# Patient Record
Sex: FEMALE | URBAN - METROPOLITAN AREA
[De-identification: names, ages, dates, MRNs, and addresses within clinical notes are randomized per-mention and may not be internally consistent; named-entity substitution may affect disease eponyms.]

---

## 2017-01-17 ENCOUNTER — EMERGENCY (EMERGENCY)
Facility: HOSPITAL | Age: 27
LOS: 1 days | End: 2017-01-17
Payer: SELF-PAY

## 2017-01-17 DIAGNOSIS — Z98.89 OTHER SPECIFIED POSTPROCEDURAL STATES: Chronic | ICD-10-CM

## 2017-01-17 PROCEDURE — 99285 EMERGENCY DEPT VISIT HI MDM: CPT | Mod: 25

## 2017-01-17 PROCEDURE — 99053 MED SERV 10PM-8AM 24 HR FAC: CPT

## 2017-01-17 PROCEDURE — 70450 CT HEAD/BRAIN W/O DYE: CPT | Mod: 26

## 2017-01-29 ENCOUNTER — EMERGENCY (EMERGENCY)
Facility: HOSPITAL | Age: 27
LOS: 1 days | End: 2017-01-29
Payer: SELF-PAY

## 2017-01-29 DIAGNOSIS — Z98.89 OTHER SPECIFIED POSTPROCEDURAL STATES: Chronic | ICD-10-CM

## 2017-01-29 PROCEDURE — 70450 CT HEAD/BRAIN W/O DYE: CPT | Mod: 26

## 2017-01-29 PROCEDURE — 99285 EMERGENCY DEPT VISIT HI MDM: CPT

## 2017-02-23 ENCOUNTER — EMERGENCY (EMERGENCY)
Facility: HOSPITAL | Age: 27
LOS: 1 days | End: 2017-02-23
Admitting: EMERGENCY MEDICINE
Payer: SELF-PAY

## 2017-02-23 DIAGNOSIS — Z98.89 OTHER SPECIFIED POSTPROCEDURAL STATES: Chronic | ICD-10-CM

## 2017-02-23 PROCEDURE — 93971 EXTREMITY STUDY: CPT

## 2017-02-23 PROCEDURE — 99284 EMERGENCY DEPT VISIT MOD MDM: CPT

## 2017-02-23 PROCEDURE — 73610 X-RAY EXAM OF ANKLE: CPT

## 2017-02-23 PROCEDURE — 99284 EMERGENCY DEPT VISIT MOD MDM: CPT | Mod: 25

## 2017-02-23 PROCEDURE — 93971 EXTREMITY STUDY: CPT | Mod: 26,RT

## 2017-02-23 PROCEDURE — 73610 X-RAY EXAM OF ANKLE: CPT | Mod: 26,RT

## 2019-01-18 ENCOUNTER — EMERGENCY (EMERGENCY)
Facility: HOSPITAL | Age: 29
LOS: 1 days | End: 2019-01-18
Payer: OTHER GOVERNMENT

## 2019-01-18 DIAGNOSIS — Z98.89 OTHER SPECIFIED POSTPROCEDURAL STATES: Chronic | ICD-10-CM

## 2019-01-18 PROCEDURE — 99284 EMERGENCY DEPT VISIT MOD MDM: CPT

## 2019-01-18 PROCEDURE — 93970 EXTREMITY STUDY: CPT | Mod: 26

## 2019-09-24 ENCOUNTER — EMERGENCY (EMERGENCY)
Facility: HOSPITAL | Age: 29
LOS: 1 days | End: 2019-09-24
Admitting: EMERGENCY MEDICINE
Payer: SELF-PAY

## 2019-09-24 DIAGNOSIS — Z98.89 OTHER SPECIFIED POSTPROCEDURAL STATES: Chronic | ICD-10-CM

## 2019-09-24 PROCEDURE — 99284 EMERGENCY DEPT VISIT MOD MDM: CPT

## 2021-11-01 ENCOUNTER — OUTPATIENT (OUTPATIENT)
Dept: OUTPATIENT SERVICES | Facility: HOSPITAL | Age: 31
LOS: 1 days | End: 2021-11-01
Payer: MEDICAID

## 2021-11-01 DIAGNOSIS — Z98.89 OTHER SPECIFIED POSTPROCEDURAL STATES: Chronic | ICD-10-CM

## 2021-11-14 ENCOUNTER — INPATIENT (INPATIENT)
Facility: HOSPITAL | Age: 31
LOS: 0 days | Discharge: HOME | DRG: 52 | End: 2021-11-15
Attending: HOSPITALIST | Admitting: STUDENT IN AN ORGANIZED HEALTH CARE EDUCATION/TRAINING PROGRAM
Payer: MEDICAID

## 2021-11-14 VITALS
DIASTOLIC BLOOD PRESSURE: 110 MMHG | HEIGHT: 66 IN | SYSTOLIC BLOOD PRESSURE: 150 MMHG | TEMPERATURE: 98 F | HEART RATE: 97 BPM | OXYGEN SATURATION: 99 % | RESPIRATION RATE: 19 BRPM | WEIGHT: 139.99 LBS

## 2021-11-14 DIAGNOSIS — Z78.9 OTHER SPECIFIED HEALTH STATUS: Chronic | ICD-10-CM

## 2021-11-14 DIAGNOSIS — R56.9 UNSPECIFIED CONVULSIONS: ICD-10-CM

## 2021-11-14 DIAGNOSIS — Z98.89 OTHER SPECIFIED POSTPROCEDURAL STATES: Chronic | ICD-10-CM

## 2021-11-14 LAB
ADD ON TEST-SPECIMEN IN LAB: SIGNIFICANT CHANGE UP
ADD ON TEST-SPECIMEN IN LAB: SIGNIFICANT CHANGE UP
ALBUMIN SERPL ELPH-MCNC: 3.1 G/DL — LOW (ref 3.3–5)
ALP SERPL-CCNC: 100 U/L — SIGNIFICANT CHANGE UP (ref 40–120)
ALT FLD-CCNC: 27 U/L — SIGNIFICANT CHANGE UP (ref 12–78)
ANION GAP SERPL CALC-SCNC: 2 MMOL/L — LOW (ref 5–17)
AST SERPL-CCNC: 34 U/L — SIGNIFICANT CHANGE UP (ref 15–37)
BASOPHILS # BLD AUTO: 0.02 K/UL — SIGNIFICANT CHANGE UP (ref 0–0.2)
BASOPHILS NFR BLD AUTO: 0.2 % — SIGNIFICANT CHANGE UP (ref 0–2)
BILIRUB SERPL-MCNC: 0.2 MG/DL — SIGNIFICANT CHANGE UP (ref 0.2–1.2)
BUN SERPL-MCNC: 15 MG/DL — SIGNIFICANT CHANGE UP (ref 7–23)
CALCIUM SERPL-MCNC: 8.8 MG/DL — SIGNIFICANT CHANGE UP (ref 8.5–10.1)
CHLORIDE SERPL-SCNC: 104 MMOL/L — SIGNIFICANT CHANGE UP (ref 96–108)
CO2 SERPL-SCNC: 29 MMOL/L — SIGNIFICANT CHANGE UP (ref 22–31)
CREAT SERPL-MCNC: 1.09 MG/DL — SIGNIFICANT CHANGE UP (ref 0.5–1.3)
EOSINOPHIL # BLD AUTO: 0.02 K/UL — SIGNIFICANT CHANGE UP (ref 0–0.5)
EOSINOPHIL NFR BLD AUTO: 0.2 % — SIGNIFICANT CHANGE UP (ref 0–6)
GLUCOSE SERPL-MCNC: 98 MG/DL — SIGNIFICANT CHANGE UP (ref 70–99)
HCG SERPL-ACNC: <1 MIU/ML — SIGNIFICANT CHANGE UP
HCT VFR BLD CALC: 32.9 % — LOW (ref 34.5–45)
HGB BLD-MCNC: 9.1 G/DL — LOW (ref 11.5–15.5)
IMM GRANULOCYTES NFR BLD AUTO: 0.2 % — SIGNIFICANT CHANGE UP (ref 0–1.5)
LYMPHOCYTES # BLD AUTO: 0.73 K/UL — LOW (ref 1–3.3)
LYMPHOCYTES # BLD AUTO: 9.1 % — LOW (ref 13–44)
MCHC RBC-ENTMCNC: 19.3 PG — LOW (ref 27–34)
MCHC RBC-ENTMCNC: 27.7 GM/DL — LOW (ref 32–36)
MCV RBC AUTO: 69.9 FL — LOW (ref 80–100)
MONOCYTES # BLD AUTO: 0.28 K/UL — SIGNIFICANT CHANGE UP (ref 0–0.9)
MONOCYTES NFR BLD AUTO: 3.5 % — SIGNIFICANT CHANGE UP (ref 2–14)
NEUTROPHILS # BLD AUTO: 6.97 K/UL — SIGNIFICANT CHANGE UP (ref 1.8–7.4)
NEUTROPHILS NFR BLD AUTO: 86.8 % — HIGH (ref 43–77)
PLATELET # BLD AUTO: 341 K/UL — SIGNIFICANT CHANGE UP (ref 150–400)
POTASSIUM SERPL-MCNC: 4.3 MMOL/L — SIGNIFICANT CHANGE UP (ref 3.5–5.3)
POTASSIUM SERPL-SCNC: 4.3 MMOL/L — SIGNIFICANT CHANGE UP (ref 3.5–5.3)
PROT SERPL-MCNC: 9.5 GM/DL — HIGH (ref 6–8.3)
RBC # BLD: 4.71 M/UL — SIGNIFICANT CHANGE UP (ref 3.8–5.2)
RBC # FLD: 17.4 % — HIGH (ref 10.3–14.5)
SARS-COV-2 RNA SPEC QL NAA+PROBE: SIGNIFICANT CHANGE UP
SODIUM SERPL-SCNC: 135 MMOL/L — SIGNIFICANT CHANGE UP (ref 135–145)
WBC # BLD: 8.04 K/UL — SIGNIFICANT CHANGE UP (ref 3.8–10.5)
WBC # FLD AUTO: 8.04 K/UL — SIGNIFICANT CHANGE UP (ref 3.8–10.5)

## 2021-11-14 PROCEDURE — G0008: CPT

## 2021-11-14 PROCEDURE — 90686 IIV4 VACC NO PRSV 0.5 ML IM: CPT

## 2021-11-14 PROCEDURE — 95819 EEG AWAKE AND ASLEEP: CPT

## 2021-11-14 PROCEDURE — 93010 ELECTROCARDIOGRAM REPORT: CPT

## 2021-11-14 PROCEDURE — 99221 1ST HOSP IP/OBS SF/LOW 40: CPT

## 2021-11-14 PROCEDURE — 70450 CT HEAD/BRAIN W/O DYE: CPT | Mod: 26,MA

## 2021-11-14 PROCEDURE — G0378: CPT

## 2021-11-14 PROCEDURE — 36415 COLL VENOUS BLD VENIPUNCTURE: CPT

## 2021-11-14 PROCEDURE — 99285 EMERGENCY DEPT VISIT HI MDM: CPT

## 2021-11-14 PROCEDURE — 80307 DRUG TEST PRSMV CHEM ANLYZR: CPT

## 2021-11-14 RX ORDER — BUPRENORPHINE AND NALOXONE 2; .5 MG/1; MG/1
1 TABLET SUBLINGUAL DAILY
Refills: 0 | Status: DISCONTINUED | OUTPATIENT
Start: 2021-11-15 | End: 2021-11-15

## 2021-11-14 RX ORDER — INFLUENZA VIRUS VACCINE 15; 15; 15; 15 UG/.5ML; UG/.5ML; UG/.5ML; UG/.5ML
0.5 SUSPENSION INTRAMUSCULAR ONCE
Refills: 0 | Status: COMPLETED | OUTPATIENT
Start: 2021-11-14 | End: 2021-11-15

## 2021-11-14 RX ORDER — METHADONE HYDROCHLORIDE 40 MG/1
30 TABLET ORAL DAILY
Refills: 0 | Status: DISCONTINUED | OUTPATIENT
Start: 2021-11-14 | End: 2021-11-14

## 2021-11-14 RX ORDER — BUPRENORPHINE AND NALOXONE 2; .5 MG/1; MG/1
1 TABLET SUBLINGUAL EVERY 24 HOURS
Refills: 0 | Status: DISCONTINUED | OUTPATIENT
Start: 2021-11-14 | End: 2021-11-14

## 2021-11-14 RX ORDER — ACETAMINOPHEN 500 MG
650 TABLET ORAL EVERY 6 HOURS
Refills: 0 | Status: DISCONTINUED | OUTPATIENT
Start: 2021-11-14 | End: 2021-11-15

## 2021-11-14 RX ORDER — ONDANSETRON 8 MG/1
4 TABLET, FILM COATED ORAL EVERY 8 HOURS
Refills: 0 | Status: DISCONTINUED | OUTPATIENT
Start: 2021-11-14 | End: 2021-11-15

## 2021-11-14 RX ORDER — METHADONE HYDROCHLORIDE 40 MG/1
30 TABLET ORAL ONCE
Refills: 0 | Status: DISCONTINUED | OUTPATIENT
Start: 2021-11-14 | End: 2021-11-14

## 2021-11-14 RX ORDER — LANOLIN ALCOHOL/MO/W.PET/CERES
3 CREAM (GRAM) TOPICAL AT BEDTIME
Refills: 0 | Status: DISCONTINUED | OUTPATIENT
Start: 2021-11-14 | End: 2021-11-15

## 2021-11-14 RX ADMIN — METHADONE HYDROCHLORIDE 30 MILLIGRAM(S): 40 TABLET ORAL at 19:38

## 2021-11-14 RX ADMIN — Medication 2 MILLIGRAM(S): at 18:38

## 2021-11-14 RX ADMIN — Medication 3 MILLIGRAM(S): at 20:15

## 2021-11-14 RX ADMIN — Medication 1 MILLIGRAM(S): at 14:58

## 2021-11-14 NOTE — ED PROVIDER NOTE - PROGRESS NOTE DETAILS
Appears more comfortable,but still c/o feeling "very shaky",anxious, primarily concerned re paresthesia left hand x several days "I thought I slept on it wrong."  No motor findings, normal vascular exam. Glove like distribution - ?periph neuropathy.  Also states that "sometimes my right hand is numb too."    Clinical picture syncope/sz episodes unclear -- initial episode described as uncontrolled shaking without LOC, however today's episode described as similar shaking associated with loss of consciousness ("I woke up on the ground").  No chest pain, palpitations.  No SI/HI. Denies alcohol use/alcohol withdrawal.  CT head unremarkable, ECG NSR. Labs with mild anemia (HgB=9, no priors). Denies hematemesis, BRBPR, melena.  Plan: Will f/u remaining labs. Likely admit tele for monitoring, neuro eval.

## 2021-11-14 NOTE — ED PROVIDER NOTE - CARE PLAN
1 Principal Discharge DX:	Syncope  Secondary Diagnosis:	Paresthesia   Principal Discharge DX:	Seizure  Secondary Diagnosis:	Paresthesia

## 2021-11-14 NOTE — PHARMACOTHERAPY INTERVENTION NOTE - COMMENTS
Medication history complete.  Patient claimed not to be on any prescription, nothing came up on Drfirst search

## 2021-11-14 NOTE — ED ADULT TRIAGE NOTE - CHIEF COMPLAINT QUOTE
pt BIBEMS s/p fall out of bed and seizure-like activity. witnessed by partner. pt endorses using heroin this AM, same dealer, same dose as every day. + head strike. pt endorses same occurred over weekend but she did not f/u. denies hx of seizures. denies other drug use/ETOH. + numbness/tingling in BL UE. pt tremulous at triage. a&ox3. 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 + gastric ulcers, no longer on blood thinners. + clotting disorder per patient. MD Lew at triage to assess, no focal deficits,  strength equal, no facial asymmetry, Neuro alert activated. pt endorsing headache.

## 2021-11-14 NOTE — H&P ADULT - ASSESSMENT
31 year old female patient with history noted for daily heroin use and concern for possible seizure        -Admit to Tele        # Encephalopathy  -possibly related to Heroin use  -pt back at baseline  CT head negative for acute pathology  -neuro checks per routine  -will get EEG to rule out seizure    # Concern for Seizure  -pt with transient confusion and paresthesia  -get routine EEG  -give ativan prn for witnessed seizures    # Hx of Heroin abuse  -give one time dose of Methadone  -start Suboxone on 11/15/2021    #DVT ppx  -encourage ambulation

## 2021-11-14 NOTE — ED PROVIDER NOTE - OBJECTIVE STATEMENT
30 y/o with hx of opioid and cocaine abuse presents to the ED c/o seizure-like activity. Pt today had an episode where she felt very dizzy and fell back and hit her head with +LOC but unsure how long. Had a similar episode earlier this week. Unclear if this episode was seizures vs. syncope. 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 Pt is anxious, very agitated and complaining of HA. Last use of heroin was today. Evaluated in triage, no focal neurologic signs. Given headache and head injury, neuro alert activated and directed to CT.   exma keith but agitated no sI or HI    plan EKG, labs ct head, sx treatment and reassess. 30 y/o with hx of opioid and cocaine abuse presents to the ED c/o seizure-like activity. Pt today had an episode where she felt very dizzy, fell back and hit her head with +LOC but unsure how long. Had a similar episode earlier this week. Unclear if this episode was a seizure vs. syncope. 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 Pt is anxious, very agitated and complaining of HA. Last use of heroin was today. Evaluated in triage, no focal neurologic signs. Given headache and head injury, neuro alert activated and directed to CT. 32 y/o with hx of daily opioid and prior cocaine abuse presents to the ED c/o seizure-like activity. Pt today had an episode where she felt very dizzy, fell back and hit her head with +LOC but unsure how long. Had a similar episode earlier this week. Unclear if this episode was a seizure vs. syncope. 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 Pt is anxious, very agitated and complaining of HA. Last use of heroin was today. Evaluated in triage, no focal neurologic signs. Given headache and head injury, neuro alert activated and directed to CT.

## 2021-11-14 NOTE — H&P ADULT - HISTORY OF PRESENT ILLNESS
31 year old female patient with self reported history of daily heroin use presented to the ED complaining of sudden onset of bilateral paresthesia to upper extremities and transient confusion. Of note, patient took heroin prior to arrival- states she took her "regular dose" but would not specify the amount/ quantity.  Patient also with a headache, which she states has been present for one week. Denies any visual changes, slurred speech. loss of  strength, chest pain or palpitations. Patient denies any illicit drug use besides heroin. Endorsed having similar episode 24 hours prior to ED arrival.        In the ED, patient with negative CT head and appeared to be back to her baseline.

## 2021-11-14 NOTE — ED ADULT NURSE NOTE - NSIMPLEMENTINTERV_GEN_ALL_ED
Implemented All Fall Risk Interventions:  [place] to call system. Call bell, personal items and telephone within reach. Instruct patient to call for assistance. Room bathroom lighting operational. Non-slip footwear when patient is off stretcher. Physically safe environment: no spills, clutter or unnecessary equipment. Stretcher in lowest position, wheels locked, appropriate side rails in place. Provide visual cue, wrist band, yellow gown, etc. Monitor gait and stability. Monitor for mental status changes and reorient to person, place, and time. Review medications for side effects contributing to fall risk. Reinforce activity limits and safety measures with patient and family.

## 2021-11-14 NOTE — ED ADULT NURSE NOTE - OBJECTIVE STATEMENT
Pt BIBEMS s/p fall out of bed and seizure-like activity witnessed by partner. PT report she was using heroin this AM, same dealer, same dose as every day. + head strike. No bleeding, no lacerations noted. Pt denies hx of seizures. denies other drug use/ETOH. Pt c/o tingling in left hand. No facial droop noted.  strength equal. pt denies dizziness. PT c/o headache. Pt denies CP.

## 2021-11-14 NOTE — ED PROVIDER NOTE - CPE EDP ENMT NORM
goes straight to msg says the person you are tryig to reach has a vm box that has not been set up could not reach patient for folllow up appt    normal...

## 2021-11-14 NOTE — H&P ADULT - NSHPPHYSICALEXAM_GEN_ALL_CORE
Vital Signs Last 24 Hrs  T(C): 36.7 (14 Nov 2021 14:56), Max: 36.7 (14 Nov 2021 13:32)  T(F): 98 (14 Nov 2021 14:56), Max: 98 (14 Nov 2021 13:32)  HR: 81 (14 Nov 2021 14:56) (81 - 97)  BP: 117/72 (14 Nov 2021 14:56) (115/79 - 150/110)  BP(mean): 85 (14 Nov 2021 14:56) (85 - 90)  RR: 18 (14 Nov 2021 14:56) (18 - 19)  SpO2: 98% (14 Nov 2021 14:56) (98% - 100%)

## 2021-11-15 ENCOUNTER — TRANSCRIPTION ENCOUNTER (OUTPATIENT)
Age: 31
End: 2021-11-15

## 2021-11-15 VITALS
SYSTOLIC BLOOD PRESSURE: 113 MMHG | OXYGEN SATURATION: 94 % | DIASTOLIC BLOOD PRESSURE: 74 MMHG | RESPIRATION RATE: 18 BRPM | HEART RATE: 105 BPM

## 2021-11-15 LAB — PCP SPEC-MCNC: SIGNIFICANT CHANGE UP

## 2021-11-15 PROCEDURE — 95819 EEG AWAKE AND ASLEEP: CPT | Mod: 26

## 2021-11-15 PROCEDURE — 99239 HOSP IP/OBS DSCHRG MGMT >30: CPT

## 2021-11-15 RX ORDER — BNT162B2 0.23 MG/2.25ML
0.3 INJECTION, SUSPENSION INTRAMUSCULAR ONCE
Refills: 0 | Status: COMPLETED | OUTPATIENT
Start: 2021-11-15 | End: 2021-11-15

## 2021-11-15 RX ORDER — METHADONE HYDROCHLORIDE 40 MG/1
10 TABLET ORAL ONCE
Refills: 0 | Status: DISCONTINUED | OUTPATIENT
Start: 2021-11-15 | End: 2021-11-15

## 2021-11-15 RX ORDER — ONDANSETRON 8 MG/1
4 TABLET, FILM COATED ORAL ONCE
Refills: 0 | Status: DISCONTINUED | OUTPATIENT
Start: 2021-11-15 | End: 2021-11-15

## 2021-11-15 RX ADMIN — ONDANSETRON 4 MILLIGRAM(S): 8 TABLET, FILM COATED ORAL at 13:24

## 2021-11-15 RX ADMIN — INFLUENZA VIRUS VACCINE 0.5 MILLILITER(S): 15; 15; 15; 15 SUSPENSION INTRAMUSCULAR at 14:38

## 2021-11-15 RX ADMIN — BNT162B2 0.3 MILLILITER(S): 0.23 INJECTION, SUSPENSION INTRAMUSCULAR at 14:43

## 2021-11-15 RX ADMIN — METHADONE HYDROCHLORIDE 10 MILLIGRAM(S): 40 TABLET ORAL at 12:45

## 2021-11-15 NOTE — DISCHARGE NOTE PROVIDER - NSDCCPCAREPLAN_GEN_ALL_CORE_FT
PRINCIPAL DISCHARGE DIAGNOSIS  Diagnosis: Paresthesia  Assessment and Plan of Treatment: Possibly in setting of heroin use. CT head and EEG with no acute findings.

## 2021-11-15 NOTE — DISCHARGE NOTE PROVIDER - HOSPITAL COURSE
CC: parasthesia  HPI and Hospital course: 31 year old female patient with self reported history of daily heroin use presented to the ED complaining of sudden onset of bilateral paresthesia to upper extremities and transient confusion. Of note, patient took heroin prior to arrival- states she took her "regular dose" but would not specify the amount/ quantity.  Patient also with a headache, which she states has been present for one week. Denies any visual changes, slurred speech. loss of  strength, chest pain or palpitations. Patient denies any illicit drug use besides heroin. Endorsed having similar episode 24 hours prior to ED arrival.    EEG neg for seizure, CTH neg. Pt with resolution of sx, cleared for d/c. See below for problem based plan.    VITALS:  T(F): 97.7 (11-15-21 @ 07:16), Max: 98.6 (11-14-21 @ 22:37)  HR: 105 (11-15-21 @ 12:40) (70 - 105)  BP: 113/74 (11-15-21 @ 12:40) (101/51 - 162/90)  RR: 18 (11-15-21 @ 12:40) (16 - 18)  SpO2: 94% (11-15-21 @ 12:40) (94% - 100%)    PHYSICAL EXAM:  General: NAD, lying in bed  HEENT:  pupils equal and reactive, EOMI, no oropharyngeal lesions, erythema, exudates, oral thrush  NECK:   supple, no carotid bruits, no palpable lymph nodes, no thyromegaly  CV:  +S1, +S2, regular, no murmurs or rubs  RESP:   lungs clear to auscultation bilaterally, no wheezing, rales, rhonchi, good air entry bilaterally  BREAST:  not examined  GI:  abdomen soft, non-tender, non-distended, normal BS, no bruits, no abdominal masses, no palpable masses  RECTAL:  not examined  :  not examined  MSK:   normal muscle tone, no atrophy, no rigidity, no contractions  EXT:  no clubbing, no cyanosis, no edema, no calf pain, swelling or erythema  VASCULAR:  pulses equal and symmetric in the upper and lower extremities  NEURO:  AAOX3, no focal neurological deficits, follows all commands, able to move extremities spontaneously  SKIN:  no ulcers, lesions or rashes    CT 11/14/21: No significant change when allowing for differences in technique.    EEG 11/15/21: This is an abnormal EEG due to the presence of generalized slowing indicative of underlying cerebral dysfunction may be on the basis of metabolic, toxic causes. Clinical correlation recommended. No epileptiform activity noted.      # Encephalopathy  -possibly related to Heroin use  -pt back at baseline  -CT head negative for acute pathology  -EEG neg for seizure    # Concern for Seizure  -pt with transient confusion and paresthesia  -EEG neg for seizure    # Hx of Heroin abuse  -not interested in inpt suboxone taper or outpt methadone program  -reports recent HIV test, not interested in testing here    I have spent 45 min on day of d/c coordinating care.

## 2021-11-15 NOTE — DISCHARGE NOTE NURSING/CASE MANAGEMENT/SOCIAL WORK - NSDCVIVACCINE_GEN_ALL_CORE_FT
COVID-19, mRNA, LNP-S, PF, 30 mcg/0.3 mL dose (Pfizer); 15-Nov-2021 14:43; Maryann Estrada (RN); [employer]; [id number] (Exp. Date: 30-Nov-2021); IntraMuscular; Deltoid Right.; 0.3 milliLiter(s);   influenza, injectable, quadrivalent, preservative free; 15-Nov-2021 14:38; Maryann Estrada (RN); [employer]; [id number] (Exp. Date: 30-Jun-2022); IntraMuscular; Deltoid Left.; 0.5 milliLiter(s); VIS (VIS Published: 06-Aug-2021, VIS Presented: 15-Nov-2021);

## 2021-11-15 NOTE — DISCHARGE NOTE PROVIDER - CARE PROVIDER_API CALL
Beverley Purdy)  Internal Medicine  [address], [place] [address]  Phone: [phone]  Fax: [phone]  Follow Up Time: Routine

## 2021-11-15 NOTE — DISCHARGE NOTE NURSING/CASE MANAGEMENT/SOCIAL WORK - NSDCPEFALRISK_GEN_ALL_CORE
For information on Fall & injury Prevention, visit [email or web address]

## 2021-11-15 NOTE — DISCHARGE NOTE NURSING/CASE MANAGEMENT/SOCIAL WORK - PATIENT PORTAL LINK FT
You can access the FollowMyHealth Patient Portal offered by [facility] by registering at the following website: [email or web address]. By joining [employer]’s FollowMyHealth portal, you will also be able to view your health information using other applications (apps) compatible with our system.

## 2021-11-18 DIAGNOSIS — R20.2 PARESTHESIA OF SKIN: ICD-10-CM

## 2021-11-18 DIAGNOSIS — Z71.89 OTHER SPECIFIED COUNSELING: ICD-10-CM

## 2021-11-18 DIAGNOSIS — F11.10 OPIOID ABUSE, UNCOMPLICATED: ICD-10-CM

## 2021-11-18 DIAGNOSIS — T40.2X5A ADVERSE EFFECT OF OTHER OPIOIDS, INITIAL ENCOUNTER: ICD-10-CM

## 2021-11-18 DIAGNOSIS — G92.9 UNSPECIFIED TOXIC ENCEPHALOPATHY: ICD-10-CM

## 2021-11-19 PROBLEM — Z00.00 ENCOUNTER FOR PREVENTIVE HEALTH EXAMINATION: Status: ACTIVE | Noted: 2021-11-19

## 2021-11-21 ENCOUNTER — INPATIENT (INPATIENT)
Facility: HOSPITAL | Age: 31
LOS: 0 days | Discharge: HOME | DRG: 253 | End: 2021-11-22
Attending: INTERNAL MEDICINE | Admitting: HOSPITALIST
Payer: MEDICAID

## 2021-11-21 VITALS — WEIGHT: 139.99 LBS | HEART RATE: 120 BPM | HEIGHT: 66 IN | OXYGEN SATURATION: 98 %

## 2021-11-21 DIAGNOSIS — Z98.89 OTHER SPECIFIED POSTPROCEDURAL STATES: Chronic | ICD-10-CM

## 2021-11-21 DIAGNOSIS — Z78.9 OTHER SPECIFIED HEALTH STATUS: Chronic | ICD-10-CM

## 2021-11-21 LAB
ALBUMIN SERPL ELPH-MCNC: 2.7 G/DL — LOW (ref 3.3–5)
ALP SERPL-CCNC: 90 U/L — SIGNIFICANT CHANGE UP (ref 40–120)
ALT FLD-CCNC: 42 U/L — SIGNIFICANT CHANGE UP (ref 12–78)
ANION GAP SERPL CALC-SCNC: 4 MMOL/L — LOW (ref 5–17)
APPEARANCE UR: CLEAR — SIGNIFICANT CHANGE UP
AST SERPL-CCNC: 59 U/L — HIGH (ref 15–37)
BILIRUB SERPL-MCNC: 0.3 MG/DL — SIGNIFICANT CHANGE UP (ref 0.2–1.2)
BILIRUB UR-MCNC: NEGATIVE — SIGNIFICANT CHANGE UP
BUN SERPL-MCNC: 23 MG/DL — SIGNIFICANT CHANGE UP (ref 7–23)
CALCIUM SERPL-MCNC: 8 MG/DL — LOW (ref 8.5–10.1)
CHLORIDE SERPL-SCNC: 103 MMOL/L — SIGNIFICANT CHANGE UP (ref 96–108)
CO2 SERPL-SCNC: 26 MMOL/L — SIGNIFICANT CHANGE UP (ref 22–31)
COLOR SPEC: YELLOW — SIGNIFICANT CHANGE UP
CREAT SERPL-MCNC: 1.03 MG/DL — SIGNIFICANT CHANGE UP (ref 0.5–1.3)
DIFF PNL FLD: ABNORMAL
ETHANOL SERPL-MCNC: <10 MG/DL — SIGNIFICANT CHANGE UP (ref 0–10)
GLUCOSE SERPL-MCNC: 99 MG/DL — SIGNIFICANT CHANGE UP (ref 70–99)
GLUCOSE UR QL: NEGATIVE MG/DL — SIGNIFICANT CHANGE UP
HCT VFR BLD CALC: 26.1 % — LOW (ref 34.5–45)
HGB BLD-MCNC: 7.2 G/DL — LOW (ref 11.5–15.5)
KETONES UR-MCNC: NEGATIVE — SIGNIFICANT CHANGE UP
LEUKOCYTE ESTERASE UR-ACNC: NEGATIVE — SIGNIFICANT CHANGE UP
MCHC RBC-ENTMCNC: 19.4 PG — LOW (ref 27–34)
MCHC RBC-ENTMCNC: 27.6 GM/DL — LOW (ref 32–36)
MCV RBC AUTO: 70.4 FL — LOW (ref 80–100)
NITRITE UR-MCNC: NEGATIVE — SIGNIFICANT CHANGE UP
PCP SPEC-MCNC: SIGNIFICANT CHANGE UP
PH UR: 6.5 — SIGNIFICANT CHANGE UP (ref 5–8)
PLATELET # BLD AUTO: 212 K/UL — SIGNIFICANT CHANGE UP (ref 150–400)
POTASSIUM SERPL-MCNC: 4.5 MMOL/L — SIGNIFICANT CHANGE UP (ref 3.5–5.3)
POTASSIUM SERPL-SCNC: 4.5 MMOL/L — SIGNIFICANT CHANGE UP (ref 3.5–5.3)
PROT SERPL-MCNC: 8.4 GM/DL — HIGH (ref 6–8.3)
PROT UR-MCNC: 15 MG/DL
RBC # BLD: 3.71 M/UL — LOW (ref 3.8–5.2)
RBC # FLD: 18.1 % — HIGH (ref 10.3–14.5)
SODIUM SERPL-SCNC: 133 MMOL/L — LOW (ref 135–145)
SP GR SPEC: 1.01 — SIGNIFICANT CHANGE UP (ref 1.01–1.02)
TROPONIN I, HIGH SENSITIVITY RESULT: 6.17 NG/L — SIGNIFICANT CHANGE UP
UROBILINOGEN FLD QL: NEGATIVE MG/DL — SIGNIFICANT CHANGE UP
WBC # BLD: 6.51 K/UL — SIGNIFICANT CHANGE UP (ref 3.8–10.5)
WBC # FLD AUTO: 6.51 K/UL — SIGNIFICANT CHANGE UP (ref 3.8–10.5)

## 2021-11-21 PROCEDURE — G1004: CPT

## 2021-11-21 PROCEDURE — 70450 CT HEAD/BRAIN W/O DYE: CPT | Mod: 26,MG

## 2021-11-21 PROCEDURE — 93010 ELECTROCARDIOGRAM REPORT: CPT

## 2021-11-21 PROCEDURE — 99291 CRITICAL CARE FIRST HOUR: CPT

## 2021-11-21 RX ORDER — SODIUM CHLORIDE 9 MG/ML
1000 INJECTION INTRAMUSCULAR; INTRAVENOUS; SUBCUTANEOUS ONCE
Refills: 0 | Status: COMPLETED | OUTPATIENT
Start: 2021-11-21 | End: 2021-11-21

## 2021-11-21 RX ADMIN — Medication 2 MILLIGRAM(S): at 22:52

## 2021-11-21 NOTE — ED PROVIDER NOTE - PROGRESS NOTE DETAILS
MORAIMA Luna MD:  Pt's Hgb dropped 2 pts since last week.  Rectal exam: tone normal, brown stool guaiac +; [address], expires 8/31/2022, QC passed.  Dr. Brennan aware of admission; ordering 2 units PRBCs. MORAIMA Luna MD:  CT head verbal report: + vasogenic edema in area of prior AVM surgery, was present last week but moreso tonight, wasn't present in 2017.  Adm hospitalist aware.  SANDRA PA aware of ED consult. Ordering IV decadron.

## 2021-11-21 NOTE — ED PROVIDER NOTE - MUSCULOSKELETAL, MLM
Spine appears normal, range of motion is not limited, no muscle or joint tenderness. DOMINGO x4, no focal tenderness/swelling

## 2021-11-21 NOTE — ED PROVIDER NOTE - ENMT, MLM
Airway patent, Nasal mucosa clear. Mouth with normal mucosa. Throat has no vesicles, no oropharyngeal exudates and uvula is midline. Head normocephalic, atraumatic, PERRLa EOMI, oral pharynx clear, mucous membrane mildly dry

## 2021-11-21 NOTE — ED ADULT NURSE NOTE - OBJECTIVE STATEMENT
Pt A&Ox4, presents to the ED s/p apparent unwitnessed seizure. Pt reports walking to the bathroom and waking up on the floor. Pt reports LOC and head strike. Pt notes similar event 1 week ago. Pt is a heroin user x 13 years. Last heroin use today. Denies CP or SOB. Pt c/o tremors and anxiety.
(4) no impairment

## 2021-11-21 NOTE — ED PROVIDER NOTE - SKIN, MLM
Skin normal color for race, warm, dry and intact. No evidence of rash. (+) Tattoos, no evidence of cellulitis, well healed old abd surgical scar

## 2021-11-21 NOTE — ED PROVIDER NOTE - OBJECTIVE STATEMENT
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 (+) headache, palpitations, "feeling shaky." Last heroin use today. Pt hospitalized [facility] 11/14-11/17 regarding similar episode; felt dizzy, fell back, hit head, (+) LOC x unknown duration, admitted for syncope vs. seizure work up--CT head negative, EEG negative, pt declined outpatient methadone program, in patient Suboxone taper regimen. Pt reports having a headache and numbness/tingling in left arm prior to episode today, which was similar to the episode last week. Pt also describes having a moderate bifrontal headache currently. 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 (+) headache, palpitations, "feeling shaky." Last heroin use today. Pt hospitalized H&H 11/14-11/17 regarding similar episode; felt dizzy, fell back, hit head, (+) LOC x unknown duration, admitted for syncope vs. seizure work up--CT head negative, EEG negative, pt declined outpatient methadone program as well as inpatient Suboxone taper regimen. Pt reports having a headache and numbness/tingling in left arm prior to episode today, which was similar to the episode last week. Pt also describes having a moderate bifrontal headache currently.

## 2021-11-21 NOTE — ED PROVIDER NOTE - CLINICAL SUMMARY MEDICAL DECISION MAKING FREE TEXT BOX
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 (+) headache, palpitations, "feeling shaky." Last heroin use today. Pt hospitalized H&H 11/14-11/17 regarding similar episode; felt dizzy, fell back, hit head, (+) LOC x unknown duration, admitted for syncope vs. seizure work up--CT head negative, EEG negative, pt declined outpatient methadone program, in patient Suboxone taper regimen. Pt reports having a headache and numbness/tingling in left arm prior to episode today, which was similar to the episode last week. Plan: CT head, EKG, labs, fall precaution, Ativan PO, monitor, observe, reassess

## 2021-11-21 NOTE — ED PROVIDER NOTE - NEUROLOGICAL, MLM
Alert and oriented, normal speech, decreased light touch sensation left lower face, left hand, left lower leg, sensation bilaterally equal upper arms and legs, no motor deficit

## 2021-11-21 NOTE — ED PROVIDER NOTE - CARE PLAN
1 Principal Discharge DX:	GI bleed  Secondary Diagnosis:	Syncope  Secondary Diagnosis:	Anemia  Secondary Diagnosis:	Vasogenic cerebral edema

## 2021-11-21 NOTE — ED ADULT TRIAGE NOTE - CHIEF COMPLAINT QUOTE
patient presents s/p possible unwitnessed seizure.  patient was walking back from bathroom and woke up on floor.  c/o headache, palpitations and "feeling shaky." patient hospitalized last week for similar episode.  notes heroin use today.   in triage.

## 2021-11-21 NOTE — ED PROVIDER NOTE - CONSTITUTIONAL, MLM
normal... Well appearing, awake, alert, oriented to person, place, time/situation and in no apparent distress. White female adult, nontoxic

## 2021-11-22 ENCOUNTER — APPOINTMENT (OUTPATIENT)
Dept: INTERNAL MEDICINE | Facility: CLINIC | Age: 31
End: 2021-11-22

## 2021-11-22 ENCOUNTER — TRANSCRIPTION ENCOUNTER (OUTPATIENT)
Age: 31
End: 2021-11-22

## 2021-11-22 VITALS
DIASTOLIC BLOOD PRESSURE: 86 MMHG | OXYGEN SATURATION: 100 % | SYSTOLIC BLOOD PRESSURE: 144 MMHG | TEMPERATURE: 98 F | HEART RATE: 91 BPM | RESPIRATION RATE: 18 BRPM

## 2021-11-22 DIAGNOSIS — K92.2 GASTROINTESTINAL HEMORRHAGE, UNSPECIFIED: ICD-10-CM

## 2021-11-22 LAB
BASOPHILS # BLD AUTO: 0.01 K/UL — SIGNIFICANT CHANGE UP (ref 0–0.2)
BASOPHILS NFR BLD AUTO: 0.2 % — SIGNIFICANT CHANGE UP (ref 0–2)
COVID-19 NUCLEOCAPSID GAM AB INTERP: NEGATIVE — SIGNIFICANT CHANGE UP
COVID-19 NUCLEOCAPSID TOTAL GAM ANTIBODY RESULT: 0.08 INDEX — SIGNIFICANT CHANGE UP
COVID-19 SPIKE DOMAIN AB INTERP: NEGATIVE — SIGNIFICANT CHANGE UP
COVID-19 SPIKE DOMAIN ANTIBODY RESULT: 0.4 U/ML — SIGNIFICANT CHANGE UP
EOSINOPHIL # BLD AUTO: 0.13 K/UL — SIGNIFICANT CHANGE UP (ref 0–0.5)
EOSINOPHIL NFR BLD AUTO: 2 % — SIGNIFICANT CHANGE UP (ref 0–6)
IMM GRANULOCYTES NFR BLD AUTO: 0.5 % — SIGNIFICANT CHANGE UP (ref 0–1.5)
LYMPHOCYTES # BLD AUTO: 1.33 K/UL — SIGNIFICANT CHANGE UP (ref 1–3.3)
LYMPHOCYTES # BLD AUTO: 20.4 % — SIGNIFICANT CHANGE UP (ref 13–44)
MONOCYTES # BLD AUTO: 0.27 K/UL — SIGNIFICANT CHANGE UP (ref 0–0.9)
MONOCYTES NFR BLD AUTO: 4.1 % — SIGNIFICANT CHANGE UP (ref 2–14)
NEUTROPHILS # BLD AUTO: 4.74 K/UL — SIGNIFICANT CHANGE UP (ref 1.8–7.4)
NEUTROPHILS NFR BLD AUTO: 72.8 % — SIGNIFICANT CHANGE UP (ref 43–77)
SARS-COV-2 IGG+IGM SERPL QL IA: 0.08 INDEX — SIGNIFICANT CHANGE UP
SARS-COV-2 IGG+IGM SERPL QL IA: 0.4 U/ML — SIGNIFICANT CHANGE UP
SARS-COV-2 IGG+IGM SERPL QL IA: NEGATIVE — SIGNIFICANT CHANGE UP
SARS-COV-2 IGG+IGM SERPL QL IA: NEGATIVE — SIGNIFICANT CHANGE UP

## 2021-11-22 PROCEDURE — 70496 CT ANGIOGRAPHY HEAD: CPT

## 2021-11-22 PROCEDURE — 36415 COLL VENOUS BLD VENIPUNCTURE: CPT

## 2021-11-22 PROCEDURE — 99236 HOSP IP/OBS SAME DATE HI 85: CPT

## 2021-11-22 PROCEDURE — 95819 EEG AWAKE AND ASLEEP: CPT | Mod: 26

## 2021-11-22 PROCEDURE — 74177 CT ABD & PELVIS W/CONTRAST: CPT

## 2021-11-22 PROCEDURE — 85027 COMPLETE CBC AUTOMATED: CPT

## 2021-11-22 PROCEDURE — 74177 CT ABD & PELVIS W/CONTRAST: CPT | Mod: 26

## 2021-11-22 PROCEDURE — C9113: CPT

## 2021-11-22 PROCEDURE — 95819 EEG AWAKE AND ASLEEP: CPT

## 2021-11-22 PROCEDURE — 36430 TRANSFUSION BLD/BLD COMPNT: CPT

## 2021-11-22 PROCEDURE — 99221 1ST HOSP IP/OBS SF/LOW 40: CPT

## 2021-11-22 PROCEDURE — 12345: CPT | Mod: NC,GC

## 2021-11-22 PROCEDURE — 86920 COMPATIBILITY TEST SPIN: CPT

## 2021-11-22 PROCEDURE — P9016: CPT

## 2021-11-22 PROCEDURE — 99222 1ST HOSP IP/OBS MODERATE 55: CPT

## 2021-11-22 PROCEDURE — 70496 CT ANGIOGRAPHY HEAD: CPT | Mod: 26

## 2021-11-22 RX ORDER — METHADONE HYDROCHLORIDE 40 MG/1
20 TABLET ORAL ONCE
Refills: 0 | Status: DISCONTINUED | OUTPATIENT
Start: 2021-11-22 | End: 2021-11-22

## 2021-11-22 RX ORDER — ACETAMINOPHEN 500 MG
650 TABLET ORAL EVERY 6 HOURS
Refills: 0 | Status: DISCONTINUED | OUTPATIENT
Start: 2021-11-22 | End: 2021-11-22

## 2021-11-22 RX ORDER — LANOLIN ALCOHOL/MO/W.PET/CERES
5 CREAM (GRAM) TOPICAL ONCE
Refills: 0 | Status: COMPLETED | OUTPATIENT
Start: 2021-11-22 | End: 2021-11-22

## 2021-11-22 RX ORDER — HYDRALAZINE HCL 50 MG
5 TABLET ORAL THREE TIMES A DAY
Refills: 0 | Status: DISCONTINUED | OUTPATIENT
Start: 2021-11-22 | End: 2021-11-22

## 2021-11-22 RX ORDER — DEXAMETHASONE 0.5 MG/5ML
6 ELIXIR ORAL ONCE
Refills: 0 | Status: COMPLETED | OUTPATIENT
Start: 2021-11-22 | End: 2021-11-22

## 2021-11-22 RX ORDER — PANTOPRAZOLE SODIUM 20 MG/1
40 TABLET, DELAYED RELEASE ORAL EVERY 12 HOURS
Refills: 0 | Status: DISCONTINUED | OUTPATIENT
Start: 2021-11-22 | End: 2021-11-22

## 2021-11-22 RX ADMIN — Medication 5 MILLIGRAM(S): at 03:18

## 2021-11-22 RX ADMIN — Medication 1 MILLIGRAM(S): at 15:53

## 2021-11-22 RX ADMIN — PANTOPRAZOLE SODIUM 40 MILLIGRAM(S): 20 TABLET, DELAYED RELEASE ORAL at 13:02

## 2021-11-22 RX ADMIN — Medication 2 MILLIGRAM(S): at 13:11

## 2021-11-22 RX ADMIN — METHADONE HYDROCHLORIDE 20 MILLIGRAM(S): 40 TABLET ORAL at 08:46

## 2021-11-22 RX ADMIN — Medication 1 MILLIGRAM(S): at 09:44

## 2021-11-22 RX ADMIN — Medication 2 MILLIGRAM(S): at 04:28

## 2021-11-22 RX ADMIN — Medication 25 MILLIGRAM(S): at 14:12

## 2021-11-22 RX ADMIN — Medication 6 MILLIGRAM(S): at 03:18

## 2021-11-22 NOTE — CONSULT NOTE ADULT - SUBJECTIVE AND OBJECTIVE BOX
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 Pt states she is unsure of what happened earlier. She remembers walking back from bathroom at home and then woke up on floor, possibly "shaking". She c/o headache, palpitations, and "feeling shaky" on arrival. Pt hospitalized here 11/14-11/17 with similar episode, had EEG which was negative. Pt admits to Left UE / LE numbness that started last week after that event. CT head sig for increased vasogenic cerebral edema at site of prior AVM embolism. At the time of my exam pt appears to be comfortable, in NAD. Pt denies visual changes, focal weakness, word finding difficulty, neck stiffness, photophobia.          PAST MEDICAL & SURGICAL HISTORY:  Opioid abuse  Cocaine abuse        FAMILY HISTORY:  No known problems  Noncontributory         Social Hx: +Heroin use, last use today, denies tobacco / EtOH use        Allergies  No Known Drug Allergies  shellfish (Hives)        MEDICATIONS reviewed         ROS: Pertinent positives in HPI, all other ROS were reviewed and are negative.          Vital Signs Last 24 Hrs  T(C): 36.8 (21 Nov 2021 21:25), Max: 36.8 (21 Nov 2021 21:25)  T(F): 98.3 (21 Nov 2021 21:25), Max: 98.3 (21 Nov 2021 21:25)  HR: 94 (21 Nov 2021 21:25) (94 - 120)  BP: 104/56 (21 Nov 2021 21:25) (104/56 - 104/56)  BP(mean): 70 (21 Nov 2021 21:25) (70 - 70)  RR: 20 (21 Nov 2021 21:25) (20 - 20)  SpO2: 100% (21 Nov 2021 21:25) (98% - 100%)        Labs:                        7.2    6.51  )-----------( 212      ( 21 Nov 2021 22:54 )             26.1     133<L>  |  103  |  23  ----------------------------<  99  4.5   |  26  |  1.03    Ca    8.0<L>      21 Nov 2021 22:54    TPro  8.4<H>  /  Alb  2.7<L>  /  TBili  0.3  /  DBili  x   /  AST  59<H>  /  ALT  42  /  AlkPhos  90  11-21        Radiology report:  CT Head No Cont (11.21.21 @ 22:27) >  Focal area of vasogenic edema in the posterior superior right frontal lobe, seen on axial series 5, images 35-40, above the embolization coils. This is new since 2017.  Subcortical white matter linear hypodensity in the high left frontal lobe best seen on coronal series 602, image 46, is also new 2 2017.  No acute intracranial hemorrhage, midline shift or hydrocephalus. No central herniation. No CT evidence of acute, large territorial infarct.    IMPRESSION:  1. Vasogenic edema in the high right frontal lobe.        Physical Exam:  Constitutional: Awake / alert  HEENT: PERRLA, EOMI  Neck: Supple  Respiratory: Breath sounds are clear bilaterally  Cardiovascular: S1 and S2, regular rhythm  Gastrointestinal: Soft, NT/ND  Extremities:  no edema  Vascular: No carotid Bruit  Musculoskeletal: no joint swelling/tenderness, no abnormal movements  Skin: No rashes    Neurological Exam:  HF: A x O x 3, appropriately interactive, normal affect, speech fluent, no aphasia or paraphasic errors. Naming /repetition intact   CN: PERRL, EOMI, VFF, +Dec facial sensation on Left, no NLFD, tongue midline  Motor: No pronator drift, Strength 5/5 in all 4 ext, normal bulk and tone, no tremor, rigidity or bradykinesia  Sens: Dec sens Left UE>LE, Right side intact to light touch  Reflexes: Slightly elevated L UE reflexes, no Jones's no clonus, downgoing toes b/l  Coord:  No FNFA, dysmetria, SUSANNE intact   Gait/Balance: Cannot test

## 2021-11-22 NOTE — H&P ADULT - NSHPPHYSICALEXAM_GEN_ALL_CORE
T(C): 36.4 (11-22-21 @ 03:30), Max: 36.8 (11-21-21 @ 21:25)  HR: 87 (11-22-21 @ 03:30) (85 - 120)  BP: 103/56 (11-22-21 @ 03:30) (103/56 - 111/69)  RR: 18 (11-22-21 @ 03:30) (18 - 20)  SpO2: 99% (11-22-21 @ 03:30) (96% - 100%)    CONSTITUTIONAL: awake, mildly anxious, no apparent distress    EYES: PERRLA and symmetric, EOMI, No conjunctival or scleral injection, non-icteric    ENMT: Oral mucosa with moist membranes. normal dentition; no pharyngeal injection or exudates.   	NECK: Supple, without palpable masses    RESPIRATORY: No respiratory distress, no use of accessory muscles; CTA b/l, no wheezes, rales or rhonchi,    CARDIOVASCULAR: RRRR, +S1S2, no murmurs, no rubs, no gallops; no peripheral edema  	Vascular: carotid pulse palpable, radial pulse palpable, dorsalis pedis pulse palpable, posterior tibialis pulse palpable    GASTROINTESTINAL: Soft, non tender, non distended, no rebound, no guarding; No palpable masses; surgical scarring from prior operation   	Rectal: not examined     GENITOURINARY:   	Breasts: not examined     	FEMALE: Not examined     MUSCULOSKELETAL: Able to move all four extremities, without difficulty. No atrophy, normal muscle tone.     SKIN: No rashes or ulcers noted; patient has tattoos on the arms. Skin is warm, moist and intact. Normal cap refill < 2 seconds     NEUROLOGIC: Speaking in full coherent sentences, answers questions appropriately. Alert and oriented to person, place and time. No focal neurologic deficits appreciated. No slurred speech, no facial drooping. Normal  strength b/l    PSYCHIATRIC: Appropriate insight/judgment; A+O x 3, mood and affect appropriate, recent/remote memory intact

## 2021-11-22 NOTE — DISCHARGE NOTE NURSING/CASE MANAGEMENT/SOCIAL WORK - PATIENT PORTAL LINK FT
You can access the FollowMyHealth Patient Portal offered by [facility] by registering at the following website: [email or web address]. By joining [employer]’s FollowMyHealth portal, you will also be able to view your health information using other applications (apps) compatible with our system.

## 2021-11-22 NOTE — H&P ADULT - ASSESSMENT
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 Pt states she is unsure of what happened earlier. She remembers walking back from bathroom at home and then woke up on floor, possibly "shaking". She c/o headache, palpitations, and "feeling shaky" on arrival. Pt hospitalized here 11/14-11/17 with similar episode, admitted for syncope vs. seizure work up--CT head negative, EEG negative, pt declined outpatient methadone program, in patient Suboxone taper regimen. Pt reports having a headache and numbness/tingling in left arm prior to episode today, which was similar to the episode last week. CT head sig for increased vasogenic cerebral edema at site of prior AVM embolism. Lab work was also significant for a decrease in Hgb from 9-> 7.2 in one week. Patient's stool was guaiac +, patient currently receiving PRBC transfusion. Patient being admitted for further workup and medical management.     PLAN:     1. GI Bleed   - guaiac+ stool, drop in Hgb from 9-> 7.2   -Patient is receiving 2 units PRBCs   -monitor post transfusion H/H   -IV Protonix   -No NSAIDs/ AC   -GI consult      2. Vasogenic Cerebral Edema/ syncope   -Neurosurgery consult note appreciated   -No surgical intervention at this time   -IV Decadron administered in ED   -CTA Head, MRI Head   -Repeat EEG     3. Polysubstance Abuse   -Patient is a current daily heroin user. Declined inpatient Suboxone taper and outpatient methadone program  -Will order one time dose of Methadone   -PO Ativan prn for anxiety    4. Headache   -Tylenol prn     5. Hyponatremia   -Mild (Na 133)   -Patient received NS bolus in ED.    -Monitor trend on lab work in AM     6. DVT Prophylaxis   -No pharmacologic prophylaxis in light of GI bleed. 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 Pt states she is unsure of what happened earlier. She remembers walking back from bathroom at home and then woke up on floor, possibly "shaking". She c/o headache, palpitations, and "feeling shaky" on arrival. Pt hospitalized here 11/14-11/17 with similar episode, admitted for syncope vs. seizure work up--CT head negative, EEG negative, pt declined outpatient methadone program, in patient Suboxone taper regimen. Pt reports having a headache and numbness/tingling in left arm prior to episode today, which was similar to the episode last week. CT head sig for increased vasogenic cerebral edema at site of prior AVM embolism. Lab work was also significant for a decrease in Hgb from 9-> 7.2 in one week. Patient's stool was guaiac +, patient currently receiving PRBC transfusion. Patient being admitted for further workup and medical management.     PLAN:     1. GI Bleed   - guaiac+ stool, drop in Hgb from 9-> 7.2   -Patient is receiving 2 units PRBCs   -monitor post transfusion H/H   -IV Protonix   -No NSAIDs/ AC   -GI consult      2. Vasogenic Cerebral Edema/ syncope   -Neurosurgery consult note appreciated   -No surgical intervention at this time   -IV Decadron administered in ED   -CTA Head, MRI Head   -Repeat EEG   -Neuro checks per routine      3. Polysubstance Abuse   -Patient is a current daily heroin user. Declined inpatient Suboxone taper and outpatient methadone program  -Will order one time dose of Methadone   -PO Ativan prn for anxiety    4. Headache   -Tylenol prn     5. Hyponatremia   -Mild (Na 133)   -Patient received NS bolus in ED.    -Monitor trend on lab work in AM     6. DVT Prophylaxis   -No pharmacologic prophylaxis in light of GI bleed. 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 Pt states she is unsure of what happened earlier. She remembers walking back from bathroom at home and then woke up on floor, possibly "shaking". She c/o headache, palpitations, and "feeling shaky" on arrival. Pt hospitalized here 11/14-11/17 with similar episode, admitted for syncope vs. seizure work up--CT head negative, EEG negative, pt declined outpatient methadone program, in patient Suboxone taper regimen. Pt reports having a headache and numbness/tingling in left arm prior to episode today, which was similar to the episode last week. CT head sig for increased vasogenic cerebral edema at site of prior AVM embolism. Lab work was also significant for a decrease in Hgb from 9-> 7.2 in one week. Patient's stool was guaiac +, patient currently receiving PRBC transfusion. Patient being admitted for further workup and medical management.     PLAN:     1. Acute Blood Loss Anemia   -likely due to upper or lower GI Bleed   - guaiac+ stool, drop in Hgb from 9-> 7.2   -Patient is receiving 2 units PRBCs   -trend  H/H q 6 hrs    -IV Protonix   -No NSAIDs/ AC   -GI consult    - NPO after midnight  -Iron panel (ferritin, B12, folate)      2. Vasogenic Cerebral Edema/ syncope   -Neurosurgery consult note appreciated   -No surgical intervention at this time   -IV Decadron administered in ED   -CTA Head, MRI Head   -Repeat EEG   -Neuro checks q 4hrs     -hydralazine 5mg IVP prn for SBP > 150  -Hold off on carotid Doppler and Echo, if patient does not improve after transfusion     3. Polysubstance Abuse Heroin Cocaine)   -Patient is a current daily heroin user. Declined inpatient Suboxone taper and outpatient methadone program  -PO Ativan prn for anxiety  -Monitor for chest pain, keep BP controlled   -Utox not positive for opiates. Will need a conversation to assess patient's willingness for methadone treatment as outpt. Patient refused in the past     4. Headache   -Tylenol prn     5. Hyponatremia   -Mild (Na 133)   -Patient received NS bolus in ED.    -Monitor trend on lab work in AM     6. Abnormal UA   -Patient is asymptomatic, no fever , no leukocytosis   -? contaminant   -Observe off abx for now. Order Urine culture     7. DVT Prophylaxis   -No pharmacologic prophylaxis in light of GI bleed. 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 Pt states she is unsure of what happened earlier. She remembers walking back from bathroom at home and then woke up on floor, possibly "shaking". She c/o headache, palpitations, and "feeling shaky" on arrival. Pt hospitalized here 11/14-11/17 with similar episode, admitted for syncope vs. seizure work up--CT head negative, EEG negative, pt declined outpatient methadone program, in patient Suboxone taper regimen. Pt reports having a headache and numbness/tingling in left arm prior to episode today, which was similar to the episode last week. CT head sig for increased vasogenic cerebral edema at site of prior AVM embolism. Lab work was also significant for a decrease in Hgb from 9-> 7.2 in one week. Patient's stool was guaiac +, patient currently receiving PRBC transfusion. Patient being admitted for further workup and medical management.     PLAN:     1. Acute Blood Loss Anemia   -likely due to upper or lower GI Bleed   - guaiac+ stool, drop in Hgb from 9-> 7.2   -Patient is receiving 2 units PRBCs   -trend  H/H q 6 hrs    -IV Protonix   -No NSAIDs/ AC   -GI consult    - NPO after midnight  -Iron panel (ferritin, B12, folate)      2. Vasogenic Cerebral Edema/ syncope   -Neurosurgery consult note appreciated   -No surgical intervention at this time   -IV Decadron administered in ED   -CTA Head, MRI Head   -Repeat EEG   -Neuro checks q 4hrs     -hydralazine 5mg IVP prn for SBP > 150  -Hold off on carotid Doppler and Echo, if patient does not improve after transfusion     3. Polysubstance Abuse Heroin Cocaine)   -Patient is a current daily heroin user. Declined inpatient Suboxone taper and outpatient methadone program  -PO Ativan prn for anxiety  -Monitor for chest pain, keep BP controlled   -Utox not positive for opiates. Will need a conversation to assess patient's willingness for methadone treatment as outpt. Patient refused in the past     4. Headache   -Tylenol prn     5. Hyponatremia   -Mild (Na 133)   -Patient received NS bolus in ED.    -Monitor trend on lab work in AM     6. Abnormal UA   -Patient is asymptomatic, no fever , no leukocytosis   -? contaminant   -Observe off abx for now.   -Order Urine culture     7. DVT Prophylaxis   -No pharmacologic prophylaxis in light of GI bleed. Will order Venodynes     Case and Plan Reviewed by Attending Physician

## 2021-11-22 NOTE — H&P ADULT - NSHPREVIEWOFSYSTEMS_GEN_ALL_CORE
REVIEW OF SYSTEMS:    CONSTITUTIONAL: No weakness, fevers or chills  EYES/ENT: No visual changes;  No vertigo or throat pain   NECK: No pain or stiffness  RESPIRATORY: No cough, wheezing, hemoptysis; No shortness of breath  CARDIOVASCULAR: Endorses palpitations, but No chest pain  GASTROINTESTINAL: No abdominal or epigastric pain. No nausea, vomiting, or hematemesis; No diarrhea or constipation. No melena or hematochezia.  GENITOURINARY: No dysuria, frequency or hematuria  NEUROLOGICAL: Denies current numbness or weakness  SKIN: No itching, burning, rashes, or lesions   All other review of systems is negative unless indicated above.

## 2021-11-22 NOTE — CHART NOTE - NSCHARTNOTEFT_GEN_A_CORE
Pt seen and examined with house staff.  Plan formulated and reviewed on rounds    Briefly,  32 y/o female heroin abuser and h/o CNS AVM s/p clipping admitted with anemia and GP stool.  Likely subacute.  Given 2u PRBC.   CTA Abd/Pelvis reviewed with Dr Marcus--no active extravasation of blood.  Varices as noted.    She was code flight this morning.  Irritated and angry.  Limited hx provided.  Wants to leave hospital  Stable vitals  No fevers    Grossly non focal     labs reviewed      Hb 9.2      Can DC home if GI is not planning any further work up as inpt.  To d/w GI team  Cont with current Rx

## 2021-11-22 NOTE — DISCHARGE NOTE PROVIDER - HOSPITAL COURSE
31F PMHx of intracranial bleed due to AVM rupture s/p coiling (approx 5 yrs ago in [place]), daily heroin use presented to [facility] ED on 11/22 after syncopal episode. In the ED, CT head was done which showed increased vasogenic cerebral edema at site of prior AVM embolism. Addtionally, CBC was significant for Hgb of 7.2 (from 9.2 one week prior). Pt's stool guaic was + and received PRBC infusion. Pt was subsequently admitted for further management. During pt's hospital course, neurosurgery was consulted who recommended no acute neurosurgical intervention and CTA headembolic material in the RIGHT lateral frontal lobe with unchanged minimal edema located immediately superior and posterior to this region suggesting small associated infarct cortical infarction (seen previously in CT Head one week prior. Neurosurgery also reocmmended MRI which pt refused to get. CT abd/pel with IV contrast No active gastrointestinal bleed, Intramural gastric varices, Perisplenic varices. GI was consulted who recommended conservative treatment. Hgb was 9.0 this AM. Pt's vitals are stable. Pt is medically optimized for discharge.     Subjective  Pt seen and evaluated at bedside this AM. NAD. A&Ox3.      REVIEW OF SYSTEMS:  CONSTITUTIONAL: No weakness, fevers or chills  RESPIRATORY: No cough, wheezing, hemoptysis; No shortness of breath  CARDIOVASCULAR: No chest pain or palpitations  GASTROINTESTINAL: No abdominal or epigastric pain. No nausea, vomiting, or hematemesis; No diarrhea or constipation. No melena or hematochezia.      PHYSICAL EXAM:  Vital Signs Last 24 Hrs  T(C): 36.9 (22 Nov 2021 07:00), Max: 36.9 (22 Nov 2021 07:00)  T(F): 98.4 (22 Nov 2021 07:00), Max: 98.4 (22 Nov 2021 07:00)  HR: 92 (22 Nov 2021 12:07) (78 - 120)  BP: 119/86 (22 Nov 2021 12:07) (103/56 - 119/86)  BP(mean): 70 (21 Nov 2021 21:25) (70 - 70)  RR: 18 (22 Nov 2021 12:07) (16 - 20)  SpO2: 99% (22 Nov 2021 12:07) (96% - 100%)    Constitutional: Pt lying in bed, awake and alert, NAD  Respiratory: CTABL, No wheezing, rales or rhonchi  Cardiovascular: S1S2+, RRR, no M/G/R  Gastrointestinal: BS+, soft, NT/ND, no guarding, no rebound  Extremities: No peripheral edema    < from: CT Abdomen and Pelvis w/ IV Cont (11.22.21 @ 10:40) >    No active gastrointestinal bleed.  Intramural gastric varices.  Perisplenic varices.  Moderate splenomegaly.    < end of copied text >    < from: CT Angio Head w/ IV Cont (11.22.21 @ 02:58) >          1.  Intracranial circulation:  No significant vascular lesion.        2.   Brain:  embolic material in the RIGHT lateral frontal lobe with unchanged minimal edema located immediately superior and posterior to this region suggesting small associated infarct cortical infarction.    < end of copied text >     31F PMHx of intracranial bleed due to AVM rupture s/p coiling (approx 5 yrs ago in [place]), daily heroin use presented to [facility] ED on 11/22 after syncopal episode. In the ED, CT head was done which showed increased vasogenic cerebral edema at site of prior AVM embolism. Addtionally, CBC was significant for Hgb of 7.2 (from 9.2 one week prior). Pt's stool guaic was + and received PRBC infusion. Pt was subsequently admitted for further management. During pt's hospital course, neurosurgery was consulted who recommended no acute neurosurgical intervention and CTA headembolic material in the RIGHT lateral frontal lobe with unchanged minimal edema located immediately superior and posterior to this region suggesting small associated infarct cortical infarction (seen previously in CT Head one week prior. Neurosurgery also reocmmended MRI which pt refused to get. CT abd/pel with IV contrast No active gastrointestinal bleed, Intramural gastric varices, Perisplenic varices. GI was consulted who did not recommend endoscopy. Hgb was 9.0 this AM. Pt's vitals are stable. Pt is medically optimized for discharge.     Subjective  Pt seen and evaluated at bedside this AM. NAD. A&Ox3.      REVIEW OF SYSTEMS:  CONSTITUTIONAL: No weakness, fevers or chills  RESPIRATORY: No cough, wheezing, hemoptysis; No shortness of breath  CARDIOVASCULAR: No chest pain or palpitations  GASTROINTESTINAL: No abdominal or epigastric pain. No nausea, vomiting, or hematemesis; No diarrhea or constipation. No melena or hematochezia.      PHYSICAL EXAM:  Vital Signs Last 24 Hrs  T(C): 36.9 (22 Nov 2021 07:00), Max: 36.9 (22 Nov 2021 07:00)  T(F): 98.4 (22 Nov 2021 07:00), Max: 98.4 (22 Nov 2021 07:00)  HR: 92 (22 Nov 2021 12:07) (78 - 120)  BP: 119/86 (22 Nov 2021 12:07) (103/56 - 119/86)  BP(mean): 70 (21 Nov 2021 21:25) (70 - 70)  RR: 18 (22 Nov 2021 12:07) (16 - 20)  SpO2: 99% (22 Nov 2021 12:07) (96% - 100%)    Constitutional: Pt lying in bed, awake and alert, NAD  Respiratory: CTABL, No wheezing, rales or rhonchi  Cardiovascular: S1S2+, RRR, no M/G/R  Gastrointestinal: BS+, soft, NT/ND, no guarding, no rebound  Extremities: No peripheral edema    < from: CT Abdomen and Pelvis w/ IV Cont (11.22.21 @ 10:40) >    No active gastrointestinal bleed.  Intramural gastric varices.  Perisplenic varices.  Moderate splenomegaly.    < end of copied text >    < from: CT Angio Head w/ IV Cont (11.22.21 @ 02:58) >          1.  Intracranial circulation:  No significant vascular lesion.        2.   Brain:  embolic material in the RIGHT lateral frontal lobe with unchanged minimal edema located immediately superior and posterior to this region suggesting small associated infarct cortical infarction.    < end of copied text >

## 2021-11-22 NOTE — ED ADULT NURSE REASSESSMENT NOTE - NS ED NURSE REASSESS COMMENT FT1
Pt A&Ox4, resting comfortably. Pt denies any pain, CP, SOB, dizziness, n/v/d. First unit of pRBCs transfusing. Report given to JACQUI Degroot. Pending transport. Pt update don plan of care, verbalized understanding.

## 2021-11-22 NOTE — H&P ADULT - NSHPLABSRESULTS_GEN_ALL_CORE
7.2    6.51  )-----------( 212      ( 2021 22:54 )             26.1     11-    133<L>  |  103  |  23  ----------------------------<  99  4.5   |  26  |  1.03    Ca    8.0<L>      2021 22:54    TPro  8.4<H>  /  Alb  2.7<L>  /  TBili  0.3  /  DBili  x   /  AST  59<H>  /  ALT  42  /  AlkPhos  90  11-    Urinalysis Basic - ( 2021 23:22 )    Color: Yellow / Appearance: Clear / S.010 / pH: x  Gluc: x / Ketone: Negative  / Bili: Negative / Urobili: Negative mg/dL   Blood: x / Protein: 15 mg/dL / Nitrite: Negative   Leuk Esterase: Negative / RBC: 3-5 /HPF / WBC 3-5   Sq Epi: x / Non Sq Epi: Moderate / Bacteria: Moderate

## 2021-11-22 NOTE — DISCHARGE NOTE NURSING/CASE MANAGEMENT/SOCIAL WORK - NSDCVIVACCINE_GEN_ALL_CORE_FT
COVID-19, mRNA, LNP-S, PF, 30 mcg/0.3 mL dose (Pf[employer]); 15-Nov-2021 14:43; Maryann Estrada (RN); [employer]; [id number] (Exp. Date: 30-Nov-2021); IntraMuscular; Deltoid Right.; 0.3 milliLiter(s);   influenza, injectable, quadrivalent, preservative free; 15-Nov-2021 14:38; Maryann Estrada (RN); [employer]; [id number] (Exp. Date: 30-Jun-2022); IntraMuscular; Deltoid Left.; 0.5 milliLiter(s); VIS (VIS Published: 06-Aug-2021, VIS Presented: 15-Nov-2021);

## 2021-11-22 NOTE — H&P ADULT - NSICDXPASTSURGICALHX_GEN_ALL_CORE_FT
PAST SURGICAL HISTORY:  Known health problems: none Prior abdominal surgery, patient unable to provide details "something with my spleen years ago".

## 2021-11-22 NOTE — DISCHARGE NOTE PROVIDER - NSFOLLOWUPCLINICS_GEN_ALL_ED_FT
[facility]  Family Medicine  [address] [place] [address]  Phone: [phone]  Fax:

## 2021-11-22 NOTE — CONSULT NOTE ADULT - SUBJECTIVE AND OBJECTIVE BOX
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 On arrival to unit to see patient, she was walking around in the halls of the unit, she insisted on conducting interview in the hallway. She reports something similar happened earlier this month, and she was "admitted at a hospital near [place]", she does not recall the name. In the ED her occult stool was positive, and Hgb was 7.2, for which she is currently receiving 1 unit PRBC. Pt denies headache, dizziness, chest pain, palpitations, cough, SOB, abdominal pain, n/v/d, urinary symptoms, fevers, chills, weakness at this time.     PAST MEDICAL & SURGICAL HISTORY:  Opioid abuse  Cocaine abuse  Known health problems: none  Prior abdominal surgery, patient unable to provide details &quot;something with my spleen years ago&quot;.    MEDICATIONS  (STANDING):  pantoprazole  Injectable 40 milliGRAM(s) IV Push every 12 hours    MEDICATIONS  (PRN):  acetaminophen     Tablet .. 650 milliGRAM(s) Oral every 6 hours PRN Temp greater or equal to 38C (100.4F), Mild Pain (1 - 3)  hydrALAZINE Injectable 5 milliGRAM(s) IV Push three times a day PRN SBP> 150  LORazepam     Tablet 2 milliGRAM(s) Oral every 6 hours PRN Anxiety    Allergies  No Known Drug Allergies  shellfish (Hives)  Intolerances    SOCIAL HISTORY:  cocaine use, heroin use daily  FAMILY HISTORY:  No known problems    REVIEW OF SYSTEMS:  CONSTITUTIONAL: +syncope No weakness, fevers or chills  EYES/ENT: No visual changes;  No vertigo or throat pain   NECK: No pain or stiffness  RESPIRATORY: No cough, wheezing, hemoptysis; No shortness of breath  CARDIOVASCULAR: No chest pain or palpitations  GASTROINTESTINAL: No abdominal or epigastric pain. No nausea, vomiting, or hematemesis; No diarrhea or constipation. No melena or hematochezia.  GENITOURINARY: No dysuria, frequency or hematuria  NEUROLOGICAL: No numbness or weakness  SKIN: No itching, burning, rashes, or lesions   PSYCH: Normal mood and affect  All other review of systems is negative unless indicated above.  Vital Signs Last 24 Hrs  T(C): 36.9 (22 Nov 2021 07:00), Max: 36.9 (22 Nov 2021 07:00)  T(F): 98.4 (22 Nov 2021 07:00), Max: 98.4 (22 Nov 2021 07:00)  HR: 92 (22 Nov 2021 12:07) (78 - 120)  BP: 119/86 (22 Nov 2021 12:07) (103/56 - 119/86)  BP(mean): 70 (21 Nov 2021 21:25) (70 - 70)  RR: 18 (22 Nov 2021 12:07) (16 - 20)  SpO2: 99% (22 Nov 2021 12:07) (96% - 100%)    PHYSICAL EXAM:  Constitutional: NAD  HEENT: MMM  Neck: No LAD  Respiratory: CTAB  Cardiovascular: S1 and S2, RRR, no M/G/R  Gastrointestinal: BS+, soft, softly distended, nontender  Extremities: No peripheral edema  Vascular: 2+ peripheral pulses  Neurological: A/O x 3  Psychiatric: Normal mood, normal affect  Skin: No rashes    LABS:                        9.2    6.00  )-----------( 258      ( 22 Nov 2021 11:09 )             32.0     11-21    133<L>  |  103  |  23  ----------------------------<  99  4.5   |  26  |  1.03    Ca    8.0<L>      21 Nov 2021 22:54    TPro  8.4<H>  /  Alb  2.7<L>  /  TBili  0.3  /  DBili  x   /  AST  59<H>  /  ALT  42  /  AlkPhos  90  11-21      LIVER FUNCTIONS - ( 21 Nov 2021 22:54 )  Alb: 2.7 g/dL / Pro: 8.4 gm/dL / ALK PHOS: 90 U/L / ALT: 42 U/L / AST: 59 U/L / GGT: x             RADIOLOGY & ADDITIONAL STUDIES:< from: CT Abdomen and Pelvis w/ IV Cont (11.22.21 @ 10:40) >  EXAM:  CT ABDOMEN AND PELVIS IC                            PROCEDURE DATE:  11/22/2021          INTERPRETATION:  CLINICAL INFORMATION: Anemia secondary to GI bleed.    COMPARISON: None.    CONTRAST/COMPLICATIONS:  IV Contrast: Omnipaque 350  90 cc administered   10 cc discarded  Oral Contrast: NONE  Complications: None reported at time of study completion    PROCEDURE:  CT of the Abdomen and Pelvis was performed.  Precontrast, Arterial and Delayed phases were performed.  Sagittal and coronal reformats were performed.    FINDINGS:  LOWER CHEST: Thin-walled right lower lobe lung cyst. Cardiomegaly.    LIVER: Within normal limits.  BILE DUCTS: Normal caliber.  GALLBLADDER: Within normal limits.  SPLEEN: Moderately enlarged, measuring 14.7 cm in craniocaudad span.  PANCREAS: Within normal limits.  ADRENALS: Within normal limits.  KIDNEYS/URETERS: Within normal limits.    BLADDER: Within normal limits.  REPRODUCTIVE ORGANS: Uterus and adnexa within normal limits.    BOWEL: No bowel obstruction. Appendix is normal. No intraluminal contrast extravasation to suggest active gastrointestinal bleed. Endoscopic clip in the gastric fundus.  PERITONEUM: No ascites.  VESSELS: IVC filter in place. Normal caliber aorta. Patent portal, splenic and superior mesenteric veins. Perisplenic and intramural gastric varices noted.  RETROPERITONEUM/LYMPH NODES: No lymphadenopathy.  ABDOMINAL WALL: Within normal limits.  BONES: Within normal limits.    IMPRESSION:  No active gastrointestinal bleed.  Intramural gastric varices.  Perisplenic varices.  Moderate splenomegaly.    Findings were discussed with Dr. Vivek Castañeda 11/22/2021 11:03 AM by Dr. Marcus with read back confirmation.    --- End of Report ---            WADE MARCUS JR, MD; Attending Radiologist  This document has been electronically signed. 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     She came to our ER for syncope/passing out. In the ED her occult stool was positive, and Hgb was 7.2, for which she is currently receiving 1 unit PRBC.    On arrival to unit to see patient, she was walking around in the halls of the unit, she insisted on conducting interview in the hallway, maskless.  She denies any overt signs of GI bleeding like hematemesis, hematochezia, melena. No current dizziness or chest pain. Pt denies headache, dizziness, chest pain, palpitations, cough, SOB, abdominal pain, n/v/d, urinary symptoms, fevers, chills, weakness at this time.  No overt signs of GI bleeding like hematemesis, hematochezia, melena    She reports something similar happened earlier this month, and she was "admitted at a hospital near [place]", she does not recall the name.    PAST MEDICAL & SURGICAL HISTORY:  Opioid abuse  Cocaine abuse  AVM's  Prior abdominal surgery, patient unable to provide details     MEDICATIONS  (STANDING):  pantoprazole  Injectable 40 milliGRAM(s) IV Push every 12 hours    MEDICATIONS  (PRN):  acetaminophen     Tablet .. 650 milliGRAM(s) Oral every 6 hours PRN Temp greater or equal to 38C (100.4F), Mild Pain (1 - 3)  hydrALAZINE Injectable 5 milliGRAM(s) IV Push three times a day PRN SBP> 150  LORazepam     Tablet 2 milliGRAM(s) Oral every 6 hours PRN Anxiety    Allergies  No Known Drug Allergies  shellfish (Hives)  Intolerances    SOCIAL HISTORY:  cocaine use, heroin use daily  FAMILY HISTORY:  No known problems    REVIEW OF SYSTEMS:  CONSTITUTIONAL: +syncope No weakness, fevers or chills  EYES/ENT: No visual changes;  No vertigo or throat pain   NECK: No pain or stiffness  RESPIRATORY: No cough, wheezing, hemoptysis; No shortness of breath  CARDIOVASCULAR: No chest pain or palpitations  GASTROINTESTINAL: No abdominal or epigastric pain. No nausea, vomiting, or hematemesis; No diarrhea or constipation. No melena or hematochezia.  GENITOURINARY: No dysuria, frequency or hematuria  NEUROLOGICAL: No numbness or weakness  SKIN: No itching, burning, rashes, or lesions   PSYCH: Normal mood and affect  All other review of systems is negative unless indicated above.  Vital Signs Last 24 Hrs  T(C): 36.9 (22 Nov 2021 07:00), Max: 36.9 (22 Nov 2021 07:00)  T(F): 98.4 (22 Nov 2021 07:00), Max: 98.4 (22 Nov 2021 07:00)  HR: 92 (22 Nov 2021 12:07) (78 - 120)  BP: 119/86 (22 Nov 2021 12:07) (103/56 - 119/86)  BP(mean): 70 (21 Nov 2021 21:25) (70 - 70)  RR: 18 (22 Nov 2021 12:07) (16 - 20)  SpO2: 99% (22 Nov 2021 12:07) (96% - 100%)    PHYSICAL EXAM:  Constitutional: NAD, walking around  HEENT: MMM  Neck: No LAD  Respiratory: CTAB  Cardiovascular: S1 and S2, RRR, no M/G/R  Gastrointestinal: BS+, soft, softly distended, nontender  Extremities: No peripheral edema  Vascular: 2+ peripheral pulses  Neurological: A/O x 3  Psychiatric: Normal mood, normal affect  Skin: No rashes    LABS:                        9.2    6.00  )-----------( 258      ( 22 Nov 2021 11:09 )             32.0     11-21    133<L>  |  103  |  23  ----------------------------<  99  4.5   |  26  |  1.03    Ca    8.0<L>      21 Nov 2021 22:54    TPro  8.4<H>  /  Alb  2.7<L>  /  TBili  0.3  /  DBili  x   /  AST  59<H>  /  ALT  42  /  AlkPhos  90  11-21      LIVER FUNCTIONS - ( 21 Nov 2021 22:54 )  Alb: 2.7 g/dL / Pro: 8.4 gm/dL / ALK PHOS: 90 U/L / ALT: 42 U/L / AST: 59 U/L / GGT: x             RADIOLOGY & ADDITIONAL STUDIES:< from: CT Abdomen and Pelvis w/ IV Cont (11.22.21 @ 10:40) >  EXAM:  CT ABDOMEN AND PELVIS IC                            PROCEDURE DATE:  11/22/2021          INTERPRETATION:  CLINICAL INFORMATION: Anemia secondary to GI bleed.    COMPARISON: None.    CONTRAST/COMPLICATIONS:  IV Contrast: Omnipaque 350  90 cc administered   10 cc discarded  Oral Contrast: NONE  Complications: None reported at time of study completion    PROCEDURE:  CT of the Abdomen and Pelvis was performed.  Precontrast, Arterial and Delayed phases were performed.  Sagittal and coronal reformats were performed.    FINDINGS:  LOWER CHEST: Thin-walled right lower lobe lung cyst. Cardiomegaly.    LIVER: Within normal limits.  BILE DUCTS: Normal caliber.  GALLBLADDER: Within normal limits.  SPLEEN: Moderately enlarged, measuring 14.7 cm in craniocaudad span.  PANCREAS: Within normal limits.  ADRENALS: Within normal limits.  KIDNEYS/URETERS: Within normal limits.    BLADDER: Within normal limits.  REPRODUCTIVE ORGANS: Uterus and adnexa within normal limits.    BOWEL: No bowel obstruction. Appendix is normal. No intraluminal contrast extravasation to suggest active gastrointestinal bleed. Endoscopic clip in the gastric fundus.  PERITONEUM: No ascites.  VESSELS: IVC filter in place. Normal caliber aorta. Patent portal, splenic and superior mesenteric veins. Perisplenic and intramural gastric varices noted.  RETROPERITONEUM/LYMPH NODES: No lymphadenopathy.  ABDOMINAL WALL: Within normal limits.  BONES: Within normal limits.    IMPRESSION:  No active gastrointestinal bleed.  Intramural gastric varices.  Perisplenic varices.  Moderate splenomegaly.    Findings were discussed with Dr. Vivek Castañeda 11/22/2021 11:03 AM by Dr. Marcus with read back confirmation.    --- End of Report ---            WADE MARCUS JR, MD; Attending Radiologist  This document has been electronically signed. Nov 22 2021 11:03AM    < end of copied text >

## 2021-11-22 NOTE — CONSULT NOTE ADULT - ATTENDING COMMENTS
31 year old opiate addict with AVM's, admitted for syncope from ? causes and chronic anemia.     She has no overt signs of bleeding.   She had similar admission and likely had AVM in the stomach that was treated as evidenced by hemostatic clip on imaging.   ?source of varices on imaging, but likely due to hematologic reason as has very large spleen. She will need heme as outpatient.   No role for acute/emergent endoscopy at this time, likely chronic finding and not necessarily endoscopically visible (in regards to varices). Doubtful she would stay for the procedure as she has been disruptive in the hospital and refusing to wear a mask/follow rules.   We are happy to see her as an outpatient. She can be discharged today.

## 2021-11-22 NOTE — CONSULT NOTE ADULT - ASSESSMENT
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     As long as patient is not overtly GI bleeding, no need for emergent endoscopic workup at this time. However I have concerns patient will not f/u as an outpatient. If h/h continues to drop despite blood transfusions, and given known varices will reeval, and consider inpatient procedure. Last Egd "a few months ago" per patient.    CTA noted, no evidence of active bleeding varices    Continue to monitor h/h and transfuse as needed  PO intake as tolerated  supportive care   PPI due to know hx of PUD    Discussed with Dr. Arias
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 Pt states she is unsure of what happened earlier. She remembers walking back from bathroom at home and then woke up on floor, possibly "shaking". She c/o headache, palpitations, and "feeling shaky" on arrival. Pt hospitalized here 11/14-11/17 with similar episode, had EEG which was negative. Pt admits to Left UE / LE numbness that started last week after that event. CT head sig for increased vasogenic cerebral edema at site of prior AVM embolism.     - No acute neurosurgical intervention   - CTA head   - MRI brain  - Rpt EEG  - SBP < 150  - Venodynes  - acute blood loss anemia and heroin abuse as per primary team    Will discuss with Dr Gutierrez    Time spent 55 minutes in review of imaging, examination of the patient, and coordination with contributing physicians

## 2021-11-22 NOTE — DISCHARGE NOTE PROVIDER - NSDCCPCAREPLAN_GEN_ALL_CORE_FT
PRINCIPAL DISCHARGE DIAGNOSIS  Diagnosis: Syncope  Assessment and Plan of Treatment: Your symptoms of loss of consciousness was likely secondary to a vasovagal response. Please follow-up with your PCP for further management. If you develop symptoms of fevers, chills, loss of consciousness, muscle weakness, difficulty talking, facial drooping, slurring of speech, please visit your nearest medical facility as soon as possible.      SECONDARY DISCHARGE DIAGNOSES  Diagnosis: Anemia  Assessment and Plan of Treatment: Your measured hemoglobin was 7.2 on admission and you received a unit of packed red blood cell. Your measured hemoglobin today was 9.0. CT abd/pelvis with IV constrast showed no evidence of active bleeding in your GI  tract. Please follow-up with a GI specialist after you leave the hospital.    Diagnosis: Cerebral edema  Assessment and Plan of Treatment: Your CT Head found increased swelling of where your prior AVM embolism was 5 years ago. Please follow-up with outpatient neurosurgery for further management.

## 2021-11-22 NOTE — H&P ADULT - HISTORY OF PRESENT ILLNESS
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 Pt states she is unsure of what happened earlier. She remembers walking back from bathroom at home and then woke up on floor, possibly "shaking". She c/o headache, palpitations, and "feeling shaky" on arrival. Pt hospitalized here 11/14-11/17 with similar episode, admitted for syncope vs. seizure work up--CT head negative, EEG negative, pt declined outpatient methadone program, in patient Suboxone taper regimen. Pt reports having a headache and numbness/tingling in left arm prior to episode today, which was similar to the episode last week. CT head sig for increased vasogenic cerebral edema at site of prior AVM embolism. Lab work was also significant for a decrease in Hgb from 9-> 7.2 in one week. Patient's stool was guaiac +, patient currently receiving PRBC transfusion.     Patient seen at bedside. Patient is initially guarded, but cooperates with interview and exam. Patient complaining that she has a lot of shaking and needs Ativan. Patient requesting IV Ativan like what she had the last time she was here. Patient also requesting methadone because she states she is in heroin withdrawal. Patient states she is anxious and feels like her heart is pounding and would like Ativan to help her relax. She doesn't endorse numbness or tingling in the upper or lower extremities at this time. She complains of a HA and is requesting Tylenol. Patient was seen by Neurosurgery. Consult note appreciated.

## 2021-11-22 NOTE — DISCHARGE NOTE PROVIDER - CARE PROVIDER_API CALL
KIKI MEDEROS S  Internal Medicine  [facility] [address] [place], [place] [id number]  Phone: [phone]  Fax: ()-  Follow Up Time:

## 2021-11-22 NOTE — H&P ADULT - ATTENDING COMMENTS
Nelida CARRERA) history as documented below reviewed. Please see resident H&P for full details regarding the service. Patient seen and examined at the bedside. 30 y/o F presented with loss of consciousness, numbness/tingling in the extremities, headaches, palpitations.    ER course: HR . Labs: Hb 7.2, MCV 70.4, RDW 18.1, Na 133, Calcium 8.0 -> corrected 9.0, Albumin 2.7, AST 59. Stool guiac positive. UA: moderate blood, RBC 3-5, moderate epithelial cells, moderate bacteria. UTox positive for cocaine. COVID negative.     EKG:   - CT Head: 1. Vasogenic edema in the high right frontal lobe. MRI is recommended. These critical results relayed to Dr. Luna at 12:16 AM.  - CTA: pending    Neurosurgery evaluated the pt and recommended no acute intervention, CTA head, MRI brain, EEG, keep SBP < 15, SCDs. Pt was given 1L of NS, Tylenol, Decadron, Ativan, Melatonin. She was placed NPO and is being admitted to medicine.     ICU Vital Signs Last 24 Hrs  T(C): 36.4 (22 Nov 2021 04:45), Max: 36.8 (21 Nov 2021 21:25)  T(F): 97.6 (22 Nov 2021 04:45), Max: 98.3 (21 Nov 2021 21:25)  HR: 85 (22 Nov 2021 04:45) (85 - 120)  BP: 105/59 (22 Nov 2021 04:45) (103/56 - 111/69)  BP(mean): 70 (21 Nov 2021 21:25) (70 - 70)  RR: 16 (22 Nov 2021 04:45) (16 - 20)  SpO2: 100% (22 Nov 2021 04:45) (96% - 100%)    General: Awake and alert, cooperative with exam. No acute distress.   Skin: Warm, dry, and pink.   Eyes: Pupils equal and reactive to light. Extraocular eye movements intact. No conjunctival injection, discharge, or scleral icterus.   HEENT: Atraumatic, normocephalic. Moist mucus membranes.  Cardiology: Normal S1, S2. No murmurs, rubs, or gallops. Regular rate and rhythm.   Respiratory: Lungs clear to ascultation bilaterally. Good air exchange. No wheezes, rales, or rhonchi. Normal chest expansion.   Gastrointestinal: Positive bowel sounds. Soft, non-tender, non-distended. No guarding, rigidity, or rebound tenderness. No hepatosplenomegaly.   Musculoskeletal: 5/5 motor strength in all extremities. Normal range of motion.   Extremities: No peripheral edema bilaterally. Dorsalis pedis pulses 2+ bilaterally.   Neurological: A+Ox3 (person, place, and time). Cranial nerves 2-12 intact. Normal speech. No facial droop. No focal neurological deficits.   Psychiatric: Normal affect. Normal mood.     30 y/o F presents with syncope     1. Syncope likely secondary to acute blood loss anemia vs. vasogenic edema around site of prior AVM embolism vs. cocaine intoxication   - Admit to telemetry   - EKG: ...  - Orthostatic vital signs   - Will hold off on carotid dopplers, ECHO, and cardiology consult for now; if pt does not improve with transfusions and measures below, will order     2. Acute blood loss anemia likely secondary to upper vs. lower GI bleed    - Hb 7.2, MCV 70.4, RDW 18.1, guiac positive, s/p 2 units of PRBCs in the ER   - Monitor H+H Q6H, transfuse for Hb < 7   - Protonix 40 mg IVP daily   - Ordered type and screen   - NPO after midnight for possible EGD in AM   - No pharmacological DVT ppx; SCDs   - Iron panel, ferritin, B12, folate  - CT abd/pelvis with IV contrast   - GI consult - Tzimas     3. Vasogenic edema in right frontal lobe at site of prior AVM embolism   - Neurosurgery consulted   - f/u CTA head official read   - Ordered MRI brain and repeat EEG   - Keep SBP < 150; hydralazine 5 mg IVP PRN for SBP > 150   - Neuro checks Q4H   - s/p 1 dose of IV decadron in ER; will monitor off for now     4. Cocaine abuse   - Monitor on telemetry, monitor for episodes of chest pain   - Keep SBP < 150   - PO Ativan PRN for anxiety   - May need to consider starting methadone as an outpatient (pt refused on last admission)    5. Asymptomatic bacteriuria   - UA appears contaminated with moderate epithelial cells   - f/u UCx, if positive will tx with abx     6. Elevated AST   - AST 59, will trend     7. 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 Please see resident H&P for full details regarding the service. Patient seen and examined at the bedside. 30 y/o F presented with loss of consciousness, numbness/tingling in the extremities, headaches, palpitations.    ER course: HR . Labs: Hb 7.2, MCV 70.4, RDW 18.1, Na 133, Calcium 8.0 -> corrected 9.0, Albumin 2.7, AST 59. Stool guiac positive. UA: moderate blood, RBC 3-5, moderate epithelial cells, moderate bacteria. UTox positive for cocaine. COVID negative. EKG: NSR with HR 97 bpm, , other intervals normal, no ST segment changes (personally reviewed). CT Head: 1. Vasogenic edema in the high right frontal lobe. MRI is recommended. These critical results relayed to Dr. Luna at 12:16 AM. CTA: pending    Neurosurgery evaluated the pt and recommended no acute intervention, CTA head, MRI brain, EEG, keep SBP < 15, SCDs. Pt was given 1L of NS, Tylenol, Decadron, Ativan, Melatonin. She was placed NPO and is being admitted to medicine.     ICU Vital Signs Last 24 Hrs  T(C): 36.4 (22 Nov 2021 04:45), Max: 36.8 (21 Nov 2021 21:25)  T(F): 97.6 (22 Nov 2021 04:45), Max: 98.3 (21 Nov 2021 21:25)  HR: 85 (22 Nov 2021 04:45) (85 - 120)  BP: 105/59 (22 Nov 2021 04:45) (103/56 - 111/69)  BP(mean): 70 (21 Nov 2021 21:25) (70 - 70)  RR: 16 (22 Nov 2021 04:45) (16 - 20)  SpO2: 100% (22 Nov 2021 04:45) (96% - 100%)    General: Awake and alert, cooperative with exam. No acute distress.   Skin: Warm, dry, and pink.   Eyes: Pupils equal and reactive to light. Extraocular eye movements intact. No conjunctival injection, discharge, or scleral icterus.   HEENT: Atraumatic, normocephalic. Moist mucus membranes.  Cardiology: Normal S1, S2. No murmurs, rubs, or gallops. Regular rate and rhythm.   Respiratory: Lungs clear to ascultation bilaterally. Good air exchange. No wheezes, rales, or rhonchi. Normal chest expansion.   Gastrointestinal: Positive bowel sounds. Soft, non-tender, non-distended. No guarding, rigidity, or rebound tenderness. No hepatosplenomegaly.   Musculoskeletal: 5/5 motor strength in all extremities. Normal range of motion.   Extremities: No peripheral edema bilaterally. Dorsalis pedis pulses 2+ bilaterally.   Neurological: A+Ox3 (person, place, and time). Cranial nerves 2-12 intact. Normal speech. No facial droop. No focal neurological deficits.   Psychiatric: Normal affect. Normal mood.     30 y/o F presents with syncope     1. Syncope likely secondary to acute blood loss anemia vs. vasogenic edema around site of prior AVM embolism vs. cocaine intoxication   - Admit to telemetry   - EKG: no ST segment changes   - Orthostatic vital signs   - Will hold off on carotid dopplers, ECHO, and cardiology consult for now; if pt does not improve with transfusions and measures below, will order     2. Acute blood loss anemia likely secondary to upper vs. lower GI bleed    - Hb 7.2, MCV 70.4, RDW 18.1, guaiac positive, s/p 2 units of PRBCs in the ER   - Monitor H+H Q6H, transfuse for Hb < 7   - Protonix 40 mg IVP daily   - Ordered type and screen   - NPO after midnight for possible EGD in AM   - No pharmacological DVT ppx; SCDs   - Iron panel, ferritin, B12, folate  - CT abd/pelvis with IV contrast   - GI consult - [facility]     3. Vasogenic edema in right frontal lobe at site of prior AVM embolism   - Neurosurgery consulted   - f/u CTA head official read   - Ordered MRI brain and repeat EEG   - Keep SBP < 150; hydralazine 5 mg IVP PRN for SBP > 150   - Neuro checks Q4H   - s/p 1 dose of IV decadron in ER; will monitor off for now     4. Cocaine abuse   - Monitor on telemetry, monitor for episodes of chest pain   - Keep SBP < 150   - PO Ativan PRN for anxiety   - May need to consider starting methadone as an outpatient (pt refused on last admission)    5. Asymptomatic bacteriuria   - UA appears contaminated with moderate epithelial cells   - f/u UCx, if positive will tx with abx     6. Elevated AST   - AST 59, will trend     7. 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

## 2021-11-28 DIAGNOSIS — F41.9 ANXIETY DISORDER, UNSPECIFIED: ICD-10-CM

## 2021-11-28 DIAGNOSIS — R16.1 SPLENOMEGALY, NOT ELSEWHERE CLASSIFIED: ICD-10-CM

## 2021-11-28 DIAGNOSIS — F14.10 COCAINE ABUSE, UNCOMPLICATED: ICD-10-CM

## 2021-11-28 DIAGNOSIS — K92.2 GASTROINTESTINAL HEMORRHAGE, UNSPECIFIED: ICD-10-CM

## 2021-11-28 DIAGNOSIS — Z87.11 PERSONAL HISTORY OF PEPTIC ULCER DISEASE: ICD-10-CM

## 2021-11-28 DIAGNOSIS — R82.71 BACTERIURIA: ICD-10-CM

## 2021-11-28 DIAGNOSIS — R55 SYNCOPE AND COLLAPSE: ICD-10-CM

## 2021-11-28 DIAGNOSIS — E87.1 HYPO-OSMOLALITY AND HYPONATREMIA: ICD-10-CM

## 2021-11-28 DIAGNOSIS — F11.10 OPIOID ABUSE, UNCOMPLICATED: ICD-10-CM

## 2021-11-28 DIAGNOSIS — R74.01 ELEVATION OF LEVELS OF LIVER TRANSAMINASE LEVELS: ICD-10-CM

## 2021-11-28 DIAGNOSIS — Z91.013 ALLERGY TO SEAFOOD: ICD-10-CM

## 2021-11-28 DIAGNOSIS — D62 ACUTE POSTHEMORRHAGIC ANEMIA: ICD-10-CM

## 2021-11-28 DIAGNOSIS — G93.6 CEREBRAL EDEMA: ICD-10-CM

## 2021-11-28 DIAGNOSIS — Z20.822 CONTACT WITH AND (SUSPECTED) EXPOSURE TO COVID-19: ICD-10-CM

## 2021-12-01 PROCEDURE — G9005: CPT

## 2023-10-06 NOTE — PATIENT PROFILE ADULT - FALL HARM RISK TYPE OF ASSESSMENT
Wound Care Instructions     FOR SUPERFICIAL WOUNDS     [facility]  [phone]                 AFTER 24 HOURS YOU SHOULD REMOVE THE BANDAGE AND BEGIN DAILY DRESSING CHANGES AS FOLLOWS:     1) Remove Dressing.     2) Clean and dry the area with tap water using a Q-tip or sterile gauze pad.     3) Apply Vaseline, Aquaphor, Polysporin ointment or Bacitracin ointment over entire wound.  Do NOT use Neosporin ointment.     4) Cover the wound with a band-aid, or a sterile non-stick gauze pad and micropore paper tape    REPEAT THESE INSTRUCTIONS AT LEAST ONCE A DAY UNTIL THE WOUND HAS COMPLETELY HEALED.    It is an old wives tale that a wound heals better when it is exposed to air and allowed to dry out. The wound will heal faster with a better cosmetic result if it is kept moist with ointment and covered with a bandage.    **Do not let the wound dry out.**    Supplies Needed:      *Cotton tipped applicators (Q-tips)    *Vaseline, Aquaphor, Polysporin or Bacitracin Ointment (NOT NEOSPORIN)    *Band-aids or non-stick gauze pads and micropore paper tape.      PATIENT INFORMATION:    During the healing process you will notice a number of changes. All wounds develop a small halo of redness surrounding the wound.  This means healing is occurring. Severe itching with extensive redness usually indicates sensitivity to the ointment or bandage tape used to dress the wound.  You should call our office if this develops.      Swelling  and/or discoloration around your surgical site is common, particularly when performed around the eye.    All wounds normally drain.  The larger the wound the more drainage there will be.  After 7-10 days, you will notice the wound beginning to shrink and new skin will begin to grow.  The wound is healed when you can see skin has formed over the entire area.  A healed wound has a healthy, shiny look to the surface and is red to dark pink in color to normalize.  Wounds may  take approximately 4-6 weeks to heal.  Larger wounds may take 6-8 weeks.  After the wound is healed you may discontinue dressing changes.    You may experience a sensation of tightness as your wound heals. This is normal and will gradually subside.    Your healed wound may be sensitive to temperature changes. This sensitivity improves with time, but if you re having a lot of discomfort, try to avoid temperature extremes.    Patients frequently experience itching after their wound appears to have healed because of the continue healing under the skin.  Plain Vaseline will help relieve the itching.      POSSIBLE COMPLICATIONS    BLEEDING:    Leave the bandage in place.  Use tightly rolled up gauze or a cloth to apply direct pressure over the bandage for 30  minutes.  Reapply pressure for an additional 30 minutes if necessary  Use additional gauze and tape to maintain pressure once the bleeding has stopped.     admission

## 2024-03-12 NOTE — ED PROVIDER NOTE - CPE EDP NEURO NORM
LMOV to discuss scheduling outpatient PT  
normal...
Gen: + fever  Eyes: No eye irritation or discharge  ENT: No ear pain, congestion, sore throat  Resp: No cough or trouble breathing  Cardiovascular: No chest pain or palpitation  Gastroenteric: SEE HPI  :  No change in urine output; no dysuria  MS: No joint or muscle pain  Skin: No rashes  Neuro: No headache; no abnormal movements  Remainder negative, except as per the HPI

## 2024-09-10 NOTE — ED ADULT NURSE NOTE - HIV OFFER
Pt notified via portal of stable labs and that no medication changes are needed. Repeat labs due 10/21/24 per protocol.   ----- Message from Jaime Stafford MD sent at 9/10/2024 11:33 AM CDT -----  Results reviewed. No change in immunosuppression   Previously Declined (within the last year)